# Patient Record
Sex: MALE | Race: OTHER
[De-identification: names, ages, dates, MRNs, and addresses within clinical notes are randomized per-mention and may not be internally consistent; named-entity substitution may affect disease eponyms.]

---

## 2019-09-29 ENCOUNTER — HOSPITAL ENCOUNTER (EMERGENCY)
Dept: HOSPITAL 50 - VM.ED | Age: 44
Discharge: HOME | End: 2019-09-29
Payer: SELF-PAY
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
## 2019-09-29 DIAGNOSIS — G56.82: ICD-10-CM

## 2019-09-29 DIAGNOSIS — S46.912A: Primary | ICD-10-CM

## 2019-09-29 LAB
ANION GAP SERPL CALC-SCNC: 15.1 MMOL/L (ref 10–20)
CHLORIDE SERPL-SCNC: 103 MMOL/L (ref 54–184)
SODIUM SERPL-SCNC: 141 MMOL/L (ref 69–191)

## 2019-09-29 NOTE — EDM.PDOC
ED HPI GENERAL MEDICAL PROBLEM





- General


Chief Complaint: General


Stated Complaint: left arm tingling 


Time Seen by Provider: 09/29/19 13:01


Source of Information: Reports: Patient


History Limitations: Reports: No Limitations





- History of Present Illness


INITIAL COMMENTS - FREE TEXT/NARRATIVE: 


Patient comes into the emergency department with left arm pain. He states it 

started 10 hours ago. He states that it woke him up. He states it is a tingling 

numbing sensation in the left shoulder that radiates to his scapular region. 

Patient denies having any coronary artery disease. He states that the pain 

discomfort has been constant since 3 AM. He states that if he does cross his 

left arm over his chest that the pain does diminish. He has recently started 

lifting weights. Patient denies any active chest pain, shortness of breath, 

dizziness, lightheadedness, n/v, or edema. He is anxious about the possibility 

of having a heart attack. 





Onset: Today, Sudden


Quality: Reports: Other


Severity: Severe


Improves with: Reports: None


Worsens with: Reports: None


Associated Symptoms: Reports: No Other Symptoms ( )


  ** Left Upper Arm


Pain Score (Numeric/FACES): 3





- Related Data


 Allergies











Allergy/AdvReac Type Severity Reaction Status Date / Time


 


No Known Allergies Allergy   Verified 09/29/19 13:29











Home Meds: 


 Home Meds





. [No Known Home Meds]  09/29/19 [History]











ED ROS GENERAL





- Review of Systems


Review Of Systems: See Below


Constitutional: Reports: No Symptoms


HEENT: Reports: No Symptoms


Respiratory: Reports: No Symptoms


Cardiovascular: Reports: No Symptoms


Endocrine: Reports: No Symptoms


GI/Abdominal: Reports: No Symptoms


: Reports: No Symptoms


Musculoskeletal: Reports: Muscle Pain


Skin: Reports: No Symptoms


Neurological: Reports: No Symptoms


Psychiatric: Reports: No Symptoms


Hematologic/Lymphatic: Reports: No Symptoms





ED EXAM, GENERAL





- Physical Exam


Exam: See Below


Exam Limited By: No Limitations


General Appearance: Alert, WD/WN, No Apparent Distress


Head: Atraumatic, Normocephalic


Neck: Normal Inspection, Supple, Non-Tender, Full Range of Motion


Respiratory/Chest: No Respiratory Distress, Lungs Clear, Normal Breath Sounds, 

No Accessory Muscle Use, Chest Non-Tender


Cardiovascular: Normal Peripheral Pulses, Regular Rate, Rhythm, No Edema


Peripheral Pulses: 3+: Radial (L), Radial (R), Dorsalis Pedis (L), Dorsalis 

Pedis (R)


GI/Abdominal: Normal Bowel Sounds, Soft, Non-Tender, No Distention, No Abnormal 

Bruit


Extremities: Normal Inspection, Normal Range of Motion, Non-Tender, No Pedal 

Edema, Normal Capillary Refill


Neurological: Alert, Oriented, CN II-XII Intact, Normal Cognition, Normal Gait, 

Normal Reflexes, No Motor/Sensory Deficits


Psychiatric: Normal Affect, Normal Mood


Skin Exam: Warm, Dry, Intact, Normal Color, No Rash





Course





- Vital Signs


Last Recorded V/S: 


 Last Vital Signs











Temp  35.7 C   09/29/19 13:05


 


Pulse  94   09/29/19 13:25


 


Resp  18   09/29/19 13:05


 


BP  146/92 H  09/29/19 13:25


 


Pulse Ox  100   09/29/19 13:05














- Orders/Labs/Meds


Orders: 


 Active Orders 24 hr











 Category Date Time Status


 


 Cardiac Monitoring [RC] .AS DIRECTED Care  09/29/19 13:08 Active











Labs: 


 Laboratory Tests











  09/29/19 09/29/19 Range/Units





  13:19 13:19 


 


WBC  9.4   (4.0-10.0)  x10^3/uL


 


RBC  5.28   (4.5-6.0)  x10^6/uL


 


Hgb  16.9   (14.0-18.0)  g/dL


 


Hct  50.2   (40.0-52.0)  %


 


MCV  95.1 H   (78.0-93.0)  fL


 


MCH  32.0   (26.0-32.0)  pg


 


MCHC  33.7   (32.0-36.0)  g/dL


 


RDW Coeff of Dawson  12.6   (10.0-15.0)  %


 


Plt Count  320   (130-400)  x10^3/uL


 


Neut % (Auto)  60.5   (50.0-80.0)  %


 


Lymph % (Auto)  26.5   (25.0-50.0)  %


 


Mono % (Auto)  8.7   (2.0-11.0)  %


 


Eos % (Auto)  3.2   (0.0-4.0)  %


 


Baso % (Auto)  1.1   (0.2-1.2)  %


 


Sodium   141  ()  mmol/L


 


Potassium   4.1  (1.5-9.9)  mmol/L


 


Chloride   103  ()  mmol/L


 


Carbon Dioxide   27  (21-32)  mmol/L


 


Anion Gap   15.1  (10-20)  mmol/L


 


BUN   15  (7-18)  mg/dL


 


Creatinine   1.0  (0.70-1.30)  mg/dL


 


Est Cr Clr Drug Dosing   93.28  mL/min


 


Estimated GFR (MDRD)   > 60  


 


Glucose   141 H  ()  mg/dL


 


Calcium   8.6  (8.5-10.1)  mg/dL


 


Corrected Calcium   8.76  (8.5-10.1)  mg/dL


 


Total Bilirubin   0.6  (0.2-1.0)  mg/dL


 


AST   23  (15-37)  U/L


 


ALT   43  (16-63)  U/L


 


Alkaline Phosphatase   94  ()  U/L


 


Troponin I   < 0.017  (<=0.056)  ng/mL


 


Total Protein   7.4  (6.4-8.2)  g/dL


 


Albumin   3.8  (3.4-5.0)  g/dL


 


Globulin   3.6  


 


Albumin/Globulin Ratio   1.06  











Meds: 


Medications














Discontinued Medications














Generic Name Dose Route Start Last Admin





  Trade Name Freq  PRN Reason Stop Dose Admin


 


Clonidine HCl  0.1 mg  09/29/19 13:09  09/29/19 13:20





  Catapres  PO  09/29/19 13:10  0.1 mg





  ONETIME ONE   Administration





     





     





     





     


 


Hydroxyzine HCl  50 mg  09/29/19 13:09  09/29/19 13:20





  Atarax  PO  09/29/19 13:10  50 mg





  ONETIME ONE   Administration





     





     





     





     














Departure





- Departure


Time of Disposition: 14:00


Disposition: Home, Self-Care 01


Condition: Good


Clinical Impression: 


 Muscle strain





Pinched nerve in shoulder


Qualifiers:


 Laterality: left Qualified Code(s): G56.82 - Other specified mononeuropathies 

of left upper limb








- Discharge Information


*PRESCRIPTION DRUG MONITORING PROGRAM REVIEWED*: Not Applicable


*COPY OF PRESCRIPTION DRUG MONITORING REPORT IN PATIENT MORGAN: Not Applicable


Instructions:  Adductor Muscle Strain, Pinched Nerve


Forms:  ED Department Discharge


Additional Instructions: 


1. rest


2. can use ice and heat over the left arm to help with discomfort


3. Can take Tylenol and ibuprofen as needed for pain and discomfort


4. Can follow up with chiropractic care or massage to help with the numbness/

tingling sensation


5. Return or follow up with PCP if symptoms progress or worsen 


6. Call with any questions or concerns 





- My Orders


Last 24 Hours: 


My Active Orders





09/29/19 13:08


Cardiac Monitoring [RC] .AS DIRECTED 














- Assessment/Plan


Last 24 Hours: 


My Active Orders





09/29/19 13:08


Cardiac Monitoring [RC] .AS DIRECTED 











Assessment:: 





1. left arm pain- muscle strain


2. pinched nerve


Plan: 





1. EKG completed in the ER. Results reviewed with patient


2. Labs completed in ER. Results reviewed with patient


3. Clonidine and hydroxyzine given to the patient in ER


4. Education regarding muscle strain provided to the patient, activity, diet, 

follow-up care, and over-the-counter medication provided


5. All questions and concerns addressed prior to patient's discharge

## 2019-10-17 ENCOUNTER — HOSPITAL ENCOUNTER (EMERGENCY)
Dept: HOSPITAL 50 - VM.ED | Age: 44
LOS: 1 days | Discharge: HOME | End: 2019-10-18
Payer: SELF-PAY

## 2019-10-17 DIAGNOSIS — K64.5: Primary | ICD-10-CM

## 2019-10-17 DIAGNOSIS — F17.210: ICD-10-CM

## 2019-10-17 PROCEDURE — 99282 EMERGENCY DEPT VISIT SF MDM: CPT

## 2019-10-17 NOTE — EDM.PDOC
ED HPI GENERAL MEDICAL PROBLEM





- General


Chief Complaint: Gastrointestinal Problem


Stated Complaint: hemorhhoids


Time Seen by Provider: 10/17/19 23:10


Source of Information: Reports: Patient





- History of Present Illness


INITIAL COMMENTS - FREE TEXT/NARRATIVE: 





Joel is a 43 y/o male who comes to the ER with rectal pain. He has a history of 

hemorrhoids and has had a flare for the last 3 days. He was traveling and did 

not eat all that well. He also forgot his supplements and has become a bit 

constipated. He has been using Tucks and Preparation H, but it has not helped. 

He can hardly sit or stand. He also reports some urgency and frequency. He 

reports that the pain is is as bad as he has ever had it.


  ** Rectal


Pain Score (Numeric/FACES): 5





- Related Data


 Allergies











Allergy/AdvReac Type Severity Reaction Status Date / Time


 


No Known Allergies Allergy   Verified 10/17/19 22:22











Home Meds: 


 Home Meds





Hydrocortisone/Pramoxine [Proctofoam-Hc 1%-1% Foam] 10 gm RC TID #1 foam 10/17/

19 [Rx]











Past Medical History





- Past Health History


Medical/Surgical History: Denies Medical/Surgical History





Social & Family History





- Tobacco Use


Smoking Status *Q: Current Every Day Smoker


Years of Tobacco use: 27


Packs/Tins Daily: 1





Review of Systems





- Review of Systems


Review Of Systems: See Below


Constitutional: Reports: No Symptoms


Eyes: Reports: No Symptoms


Ears: Reports: No Symptoms


Nose: Reports: No Symptoms


Mouth/Throat: Reports: No Symptoms


Respiratory: Reports: No Symptoms


Cardiovascular: Reports: No Symptoms


GI/Abdominal: Reports: Constipation, Other (hemorrhoids)


Genitourinary: Reports: Dysuria


Musculoskeletal: Reports: No Symptoms


Skin: Reports: No Symptoms


Neurological: Reports: No Symptoms


Psychiatric: Reports: No Symptoms





ED EXAM, GENERAL





- Physical Exam


Exam: See Below


General Appearance: Alert, WD/WN, No Apparent Distress


Ears: Hearing Grossly Normal


Nose: Normal Inspection


Throat/Mouth: Normal Lips


Neck: Normal Inspection


Respiratory/Chest: No Respiratory Distress


Cardiovascular: Regular Rate, Rhythm


GI/Abdominal: Normal Bowel Sounds, Soft, Non-Tender


 (Male) Exam: Deferred


Rectal (Males) Exam: Hemorrhoids (note several external hemorrhoids and 1 quite 

engorged and tender to touch)


Extremities: Normal Inspection


Neurological: Alert, Oriented, CN II-XII Intact, Normal Cognition


Psychiatric: Normal Affect


Skin Exam: Warm, Dry, Intact, Normal Color, No Rash


Lymphatic: No Adenopathy





Course





- Vital Signs


Text/Narrative:: 





The patient was seen by the CNP. He was given Percocet oral for pain. UA was 

ordered since he was having frequency and urgency. He was unable to urinate and 

was advised to follow up if his sx persist. Questions answered and reassurance 

given. He was given discharge instructions and sent home in stable condition.


Last Recorded V/S: 


 Last Vital Signs











Temp  37.2 C   10/17/19 22:23


 


Pulse  100   10/17/19 22:23


 


Resp  18   10/17/19 22:23


 


BP  125/95 H  10/17/19 22:23


 


Pulse Ox  96   10/17/19 22:23














- Orders/Labs/Meds


Orders: 


 Active Orders 24 hr











 Category Date Time Status


 


 UA RFX BURTON/CULT IF INDIC POC [POC] Stat Lab  10/17/19 23:13 Ordered


 


 Acetaminophen/oxyCODONE [Percocet 325-5 MG] Med  10/17/19 23:30 Active





 1 tab PO Q4H PRN   


 


 Acetaminophen/oxyCODONE [Take Home: Acetamin/oxyCODON Med  10/18/19 00:06 Once





 325-5 MG, 5 Pack]   





 1 packet PO ONETIME ONE   








 Medication Orders





Oxycodone/Acetaminophen (Percocet 325-5 Mg)  1 tab PO Q4H PRN


   PRN Reason: Pain


Oxycodone/Acetaminophen (Take Home: Acetamin/Oxycodon 325-5 Mg, 5 Pack)  1 

packet PO ONETIME ONE


   Stop: 10/18/19 00:07








Meds: 


Medications











Generic Name Dose Route Start Last Admin





  Trade Name Freq  PRN Reason Stop Dose Admin


 


Oxycodone/Acetaminophen  1 tab  10/17/19 23:30  





  Percocet 325-5 Mg  PO   





  Q4H PRN   





  Pain   





     





     





     


 


Oxycodone/Acetaminophen  1 packet  10/18/19 00:06  





  Take Home: Acetamin/Oxycodon 325-5 Mg, 5 Pack  PO  10/18/19 00:07  





  ONETIME ONE   





     





     





     





     














Discontinued Medications














Generic Name Dose Route Start Last Admin





  Trade Name Freq  PRN Reason Stop Dose Admin


 


Oxycodone/Acetaminophen  2 tab  10/17/19 23:13  10/17/19 23:25





  Percocet 325-5 Mg  PO  10/17/19 23:14  2 tab





  ONETIME ONE   Administration





     





     





     





     














Departure





- Departure


Time of Disposition: 00:07


Disposition: Home, Self-Care 01


Condition: Good


Clinical Impression: 


 Hemorrhoids, external, thrombosed





Clinical Impression: 


 (Ruled Out): Hemorrhoids, external





- Discharge Information


*PRESCRIPTION DRUG MONITORING PROGRAM REVIEWED*: No


*COPY OF PRESCRIPTION DRUG MONITORING REPORT IN PATIENT MORGAN: No


Prescriptions: 


Hydrocortisone/Pramoxine [Proctofoam-Hc 1%-1% Foam] 10 gm RC TID #1 foam


Instructions:  Hemorrhoids, Nonsurgical Procedures for Hemorrhoids


Referrals: 


PCP,Not In Area [Primary Care Provider] - 


Forms:  ED Department Discharge


Additional Instructions: 


-ProctoFoam prescription use in rectum 3x daily as needed


-Hydrocortisone Suppositories per rectum 3x day as needed #12(Rx)


-Oxycodone/APAP 5/325mg 1-2 tab oral every 4-6  hours for sever pain #5(ER)


-Warm baths with epsom salt will help with pain/swelling


-Raw potato placed on the hemorrhoid is sometimes helpful


-Do not get constipated


-Follow up with your PCP if your symptoms are not improving as expected or 

return to the ER.





- My Orders


Last 24 Hours: 


My Active Orders





10/17/19 23:13


UA RFX BURTON/CULT IF INDIC POC [POC] Stat 





10/17/19 23:30


Acetaminophen/oxyCODONE [Percocet 325-5 MG]   1 tab PO Q4H PRN 





10/18/19 00:06


Acetaminophen/oxyCODONE [Take Home: Acetamin/oxyCODON 325-5 MG, 5 Pack]   1 

packet PO ONETIME ONE 














- Assessment/Plan


Last 24 Hours: 


My Active Orders





10/17/19 23:13


UA RFX BURTON/CULT IF INDIC POC [POC] Stat 





10/17/19 23:30


Acetaminophen/oxyCODONE [Percocet 325-5 MG]   1 tab PO Q4H PRN 





10/18/19 00:06


Acetaminophen/oxyCODONE [Take Home: Acetamin/oxyCODON 325-5 MG, 5 Pack]   1 

packet PO ONETIME ONE

## 2019-10-19 ENCOUNTER — HOSPITAL ENCOUNTER (EMERGENCY)
Dept: HOSPITAL 50 - VM.ED | Age: 44
Discharge: HOME | End: 2019-10-19
Payer: SELF-PAY

## 2019-10-19 DIAGNOSIS — K64.9: Primary | ICD-10-CM

## 2019-10-19 PROCEDURE — 99282 EMERGENCY DEPT VISIT SF MDM: CPT

## 2019-10-19 PROCEDURE — 46083 INC THROMBOSED HROID XTRNL: CPT

## 2019-10-19 NOTE — EDM.PDOC
ED HPI GENERAL MEDICAL PROBLEM





- General


Chief Complaint: General


Stated Complaint: RECTAL PAIN


Time Seen by Provider: 10/19/19 20:05


Source of Information: Reports: Patient


History Limitations: Reports: No Limitations





- History of Present Illness


INITIAL COMMENTS - FREE TEXT/NARRATIVE: 





Patient comes into the emergency department with complaint of increased pain 

related to hemorrhoids. Patient does have a history of hemorrhoids in the past 

and has needed treatment for them. He was in the emergency department 

approximately 3 nights ago for increase hemorrhoid pain. At that time frame he 

was given cream/foam and pain medications to help with the pain and discomfort. 

Patient states that he has used his last pain medications as of this morning he'

s been applying the cream/foam every couple hours and the pain continues. He is 

concerned he has back to work is not been able to sleep. He describes the pain 

is 10/10. He has difficulty sitting or standing due to the pain and discomfort 

if he is lying on his side it feels much better. He also has been using sitz 

baths and does state that helps for a short period of time before the pain 

returns. Patient denies any other concerns or complaints currently


Onset: Gradual


Quality: Reports: Sharp


Severity: Severe


Improves with: Reports: Immobilization


Worsens with: Reports: Movement


Treatments PTA: Reports: Other (see below) (Proctofoam, hydrocodone)





- Related Data


 Allergies











Allergy/AdvReac Type Severity Reaction Status Date / Time


 


No Known Allergies Allergy   Verified 10/17/19 22:22











Home Meds: 


 Home Meds





Hydrocortisone/Pramoxine [Proctofoam-Hc 1%-1% Foam] 10 gm RC TID #1 foam 10/17/

19 [Rx]











Past Medical History





- Past Health History


Medical/Surgical History: Denies Medical/Surgical History





ED ROS GENERAL





- Review of Systems


Review Of Systems: ROS reveals no pertinent complaints other than HPI.


Constitutional: Reports: No Symptoms


HEENT: Reports: No Symptoms


Respiratory: Reports: No Symptoms


Cardiovascular: Reports: No Symptoms


Endocrine: Reports: No Symptoms


GI/Abdominal: Reports: No Symptoms


Musculoskeletal: Reports: No Symptoms


Skin: Reports: No Symptoms


Neurological: Reports: No Symptoms


Psychiatric: Reports: No Symptoms


Hematologic/Lymphatic: Reports: No Symptoms





ED EXAM, GENERAL





- Physical Exam


Exam: See Below


Exam Limited By: No Limitations


General Appearance: Alert, WD/WN, No Apparent Distress


Neck: Normal Inspection, Supple, Non-Tender, Full Range of Motion


Respiratory/Chest: No Respiratory Distress, No Accessory Muscle Use, Chest Non-

Tender


Cardiovascular: Normal Peripheral Pulses, Regular Rate, Rhythm, No Edema


GI/Abdominal: Normal Bowel Sounds, Soft, Non-Tender, No Distention, No Abnormal 

Bruit


Rectal (Males) Exam: Hemorrhoids (large 1.5 cm diameter )





ED GENERAL MEDICAL PROCEDURES





- Additional/Other Procedure(s)


Other (Free Text) Procedure(s): 





Incision and drainage of hemorrhoid with removal of clots


Risk and benefits were described to the patient. Area was numbed with 1% 

lidocaine area was prepped with Betadine and sterile saline. Incision was made 

with a 15 mm scalpel small blood clots extracted with tweezers. Minimal 

bleeding noted. Bleeding was contained after intervention. No complications 

noted. Patient tolerated procedure well without any issues or concerns. 

Dressing applied after procedure.





Course





- Orders/Labs/Meds


Orders: 


 Active Orders 24 hr











 Category Date Time Status


 


 Lidocaine 1% [Xylocaine-MPF 1%] Med  10/19/19 20:28 Once





 5 ml INJECT ONETIME ONE   











Meds: 


Medications














Discontinued Medications














Generic Name Dose Route Start Last Admin





  Trade Name Freq  PRN Reason Stop Dose Admin


 


Lidocaine HCl  5 ml  10/19/19 20:28  





  Xylocaine-Mpf 1%  INJECT  10/19/19 20:29  





  ONETIME ONE   





     





     





     





     














Departure





- Departure


Time of Disposition: 21:00


Disposition: Home, Self-Care 01


Clinical Impression: 


Hemorrhoids


Qualifiers:


 Hemorrhoid type: unspecified Qualified Code(s): K64.9 - Unspecified hemorrhoids








- Discharge Information


*PRESCRIPTION DRUG MONITORING PROGRAM REVIEWED*: Not Applicable


*COPY OF PRESCRIPTION DRUG MONITORING REPORT IN PATIENT MORGAN: Not Applicable


Instructions:  Disposable Sitz Bath, Hemorrhoids, Easy-to-Read


Referrals: 


PCP,None [Primary Care Provider] - 


Forms:  ED Department Discharge


Additional Instructions: 


1. rest


2. continue to use the sitz baths


3. Continue to use the foam to help with pain and discomfort  


4. Follow up in the clinic next week for permanent removal of hemorrhoid


5. activity and diet as tolerated


6. Advise to take stool softeners daily


7. Call with any questions or concerns 





- My Orders


Last 24 Hours: 


My Active Orders





10/19/19 20:28


Lidocaine 1% [Xylocaine-MPF 1%]   5 ml INJECT ONETIME ONE 














- Assessment/Plan


Last 24 Hours: 


My Active Orders





10/19/19 20:28


Lidocaine 1% [Xylocaine-MPF 1%]   5 ml INJECT ONETIME ONE 











Assessment:: 





1. Hemorrhoid pain 


Plan: 





1. Incision and drainage of the hemorrhoid with clots


2. Wound cleansing completed


3. Nitroglycerin topical cream sent home with the patient


4. Take home hydrocodone pack was provided to the patient


5. Education regarding activity, diet, follow-up care provided to the patient


6. All questions and concerns addressed to prior to discharge